# Patient Record
Sex: MALE | Race: WHITE
[De-identification: names, ages, dates, MRNs, and addresses within clinical notes are randomized per-mention and may not be internally consistent; named-entity substitution may affect disease eponyms.]

---

## 2018-11-07 ENCOUNTER — HOSPITAL ENCOUNTER (EMERGENCY)
Dept: HOSPITAL 4 - SED | Age: 28
Discharge: HOME | End: 2018-11-07
Payer: COMMERCIAL

## 2018-11-07 VITALS — WEIGHT: 200 LBS | HEIGHT: 71 IN | SYSTOLIC BLOOD PRESSURE: 141 MMHG | BODY MASS INDEX: 28 KG/M2

## 2018-11-07 VITALS — SYSTOLIC BLOOD PRESSURE: 130 MMHG

## 2018-11-07 DIAGNOSIS — R03.0: ICD-10-CM

## 2018-11-07 DIAGNOSIS — F41.9: Primary | ICD-10-CM

## 2018-11-07 PROCEDURE — 99283 EMERGENCY DEPT VISIT LOW MDM: CPT

## 2018-11-07 PROCEDURE — 96372 THER/PROPH/DIAG INJ SC/IM: CPT

## 2018-11-07 PROCEDURE — 93005 ELECTROCARDIOGRAM TRACING: CPT

## 2018-11-07 NOTE — NUR
Patient to ER via triage for evaluation of chest tightness, palpitations, 
diaphoresis, and tachycardia while at home, increasing in severity over the 
last week. Patient reports that he has been under increased stress, and that he 
has a HX of Panic Attacks. Patient is awake, alert and oriented in no acute 
distress, vital signs stable, respirations even and unlabored, skin warm and 
dry to touch. Awaiting evaluation by ER MD, will continue to observe and 
assess.

## 2018-11-07 NOTE — NUR
Patient given written and verbal discharge instructions and verbalizes 
understanding.  ER MD discussed with patient the results and treatment 
provided. Patient in stable condition. ID arm band removed. 

Rx of Xanax given. Patient educated on pain management and to follow up with 
PMD. Pain Scale 0.

Opportunity for questions provided and answered. Medication side effect fact 
sheet provided. 



Patient left ER in no acute distress, ambulating without difficulty with slow, 
steady gait with friend at his side who will drive patient home. NO adverse 
reaction noted to medication.